# Patient Record
Sex: FEMALE | Race: WHITE
[De-identification: names, ages, dates, MRNs, and addresses within clinical notes are randomized per-mention and may not be internally consistent; named-entity substitution may affect disease eponyms.]

---

## 2017-01-25 ENCOUNTER — HOSPITAL ENCOUNTER (EMERGENCY)
Dept: HOSPITAL 80 - FED | Age: 72
Discharge: HOME | End: 2017-01-25
Payer: MEDICARE

## 2017-01-25 VITALS — OXYGEN SATURATION: 96 % | HEART RATE: 70 BPM | SYSTOLIC BLOOD PRESSURE: 139 MMHG | DIASTOLIC BLOOD PRESSURE: 72 MMHG

## 2017-01-25 VITALS — TEMPERATURE: 98.4 F | RESPIRATION RATE: 16 BRPM
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
## 2017-01-25 DIAGNOSIS — S30.0XXA: Primary | ICD-10-CM

## 2017-01-25 NOTE — DX
Lumbar Spine, 2 views



History: Back pain; history of recent fall in a 71-year-old female, comparison to the prior study Dec
ember 18, 2015.



Findings: Lumbar alignment is anatomic. No fracture is identified. Stable degenerative changes are no
lorin with disk space loss at L2-L3 and L3-L4. Facet hypertrophy extends from L3-L4 to the L5-S1 level.




IMPRESSION: Negative for fracture with stable degenerative changes noted.

## 2017-01-25 NOTE — DX
AP pelvis



Reason for examination: Pain following trauma. 71-year-old female fell recently.



Findings: A fracture is not identified. The bone alignment is normal. The soft tissues are unremarkab
le.



Impression: Negative for fracture.

## 2017-01-25 NOTE — EDPHY
HPI/HX/ROS/PE/MDM


Narrative: 


Chief complaint:  Low back pain status post fall





HPI:  71-year-old female states she had a mechanical slip and fall on her steps 

yesterday.  Feet went out from under her and she landed on her back at 

approximately 2 steps.  Striking the left lower back on the step.  She did not 

hit her head.  No loss of consciousness.  He knows to shortness of breath. No 

chest pain.  No abdominal pain. Does have some pain over her left posterior 

iliac bone.  He lower extremity pain.  She has been ambulating with some mild 

discomfort.  No urinary symptoms. No hematuria.  She has been taking some 

ibuprofen with minimal relief.





ROS:  10 point Review of Systems is negative except as noted in the HPI.





Physical exam:


Gen: Awake, Alert, No Distress


HEENT: 


     Nose:  no rhinorrhea


     Eyes: PERRLA, EOMI


     Mouth: Moist mucosa 


Neck: Supple, no JVD


Chest: nontender, lungs clear to auscultation


Heart: S1, S2 normal, no murmur


Abd: Soft, non-tender, no guarding


Back: no CVA tenderness, no midline tenderness, she has tenderness in the left 

paraspinal area just above the left iliac crest and at the left iliac crest.  

There is no bony crepitus.  There is no ecchymosis


Ext: no edema, non-tender, full range of motion bilateral upper lower 

extremities without pain


Skin: no rash


Neuro: CN II-XII intact, Sensation grossly intact, Strength 5/5 in bilateral 

upper and lower extremities


ED Course: 


Lumbar spine x-ray shows no acute fractures per Radiology.


Pelvis x-ray shows no acute fracture per radiologist.





General


Time Seen by Provider: 01/25/17 12:37


Initial Vital Signs: 


 Initial Vital Signs











Temperature (C)  36.9 C   01/25/17 11:05


 


Heart Rate  72   01/25/17 11:05


 


Respiratory Rate  16   01/25/17 11:05


 


Blood Pressure  143/79 H  01/25/17 11:05


 


O2 Sat (%)  95   01/25/17 11:05











Allergies/Adverse Reactions: 


 





morphine Allergy (Verified 01/25/17 11:09)


 








Home Medications: 














 Medication  Instructions  Recorded


 


Hydrocodone/Acetaminophen 1 - 2 each PO Q4-6PRN PRN #10 01/25/17





[Hydrocodon-Acetaminophen 5-325] tablet 














Departure





- Departure


Disposition: Home, Routine, Self-Care


Clinical Impression: 


 Back contusion


Condition: Good


Instructions:  Acute Low Back Pain (ED), Contusion in Adults (ED)


Additional Instructions: 


You may take hydrocodone with acetaminophen as needed for pain.


Follow up with your primary care physician in 3-4 days for further evaluation.


Return to the emergency department for increasing pain, numbness, weakness, 

fevers, chills, abdominal pain, shortness of breath, or any other concerns.


Referrals: 


Jacki Gerardo MD [Primary Care Provider] - As per Instructions


Prescriptions: 


Hydrocodone/Acetaminophen [Hydrocodon-Acetaminophen 5-325] 1 - 2 each PO Q4-

6PRN PRN #10 tablet


 PRN Reason: Pain, Severe

## 2017-12-21 ENCOUNTER — HOSPITAL ENCOUNTER (OUTPATIENT)
Dept: HOSPITAL 80 - FIMAGING | Age: 72
End: 2017-12-21
Attending: FAMILY MEDICINE
Payer: COMMERCIAL

## 2017-12-21 DIAGNOSIS — M85.89: ICD-10-CM

## 2017-12-21 DIAGNOSIS — Z13.820: Primary | ICD-10-CM

## 2018-06-04 ENCOUNTER — HOSPITAL ENCOUNTER (OUTPATIENT)
Dept: HOSPITAL 80 - FIMAGING | Age: 73
End: 2018-06-04
Attending: FAMILY MEDICINE
Payer: COMMERCIAL

## 2018-06-04 DIAGNOSIS — Z12.31: Primary | ICD-10-CM

## 2018-06-13 ENCOUNTER — HOSPITAL ENCOUNTER (OUTPATIENT)
Dept: HOSPITAL 80 - FIMAGING | Age: 73
End: 2018-06-13
Attending: PHYSICIAN ASSISTANT
Payer: COMMERCIAL

## 2018-06-13 DIAGNOSIS — N60.01: Primary | ICD-10-CM

## 2019-01-09 ENCOUNTER — HOSPITAL ENCOUNTER (OUTPATIENT)
Dept: HOSPITAL 80 - FIMAGING | Age: 74
Discharge: HOME | End: 2019-01-09
Attending: NURSE PRACTITIONER
Payer: COMMERCIAL

## 2019-01-09 DIAGNOSIS — K59.09: ICD-10-CM

## 2019-01-09 DIAGNOSIS — N81.10: ICD-10-CM

## 2019-01-09 DIAGNOSIS — R93.3: Primary | ICD-10-CM

## 2019-01-09 PROCEDURE — 74177 CT ABD & PELVIS W/CONTRAST: CPT

## 2019-06-07 ENCOUNTER — HOSPITAL ENCOUNTER (OUTPATIENT)
Dept: HOSPITAL 80 - FIMAGING | Age: 74
End: 2019-06-07
Payer: COMMERCIAL